# Patient Record
Sex: MALE | Race: WHITE | NOT HISPANIC OR LATINO | Employment: FULL TIME | URBAN - METROPOLITAN AREA
[De-identification: names, ages, dates, MRNs, and addresses within clinical notes are randomized per-mention and may not be internally consistent; named-entity substitution may affect disease eponyms.]

---

## 2019-02-15 ENCOUNTER — HOSPITAL ENCOUNTER (EMERGENCY)
Facility: HOSPITAL | Age: 29
Discharge: HOME | End: 2019-02-16
Attending: EMERGENCY MEDICINE
Payer: MEDICAID

## 2019-02-15 VITALS
WEIGHT: 165 LBS | HEIGHT: 69 IN | OXYGEN SATURATION: 97 % | TEMPERATURE: 99 F | SYSTOLIC BLOOD PRESSURE: 129 MMHG | BODY MASS INDEX: 24.44 KG/M2 | HEART RATE: 93 BPM | RESPIRATION RATE: 18 BRPM | DIASTOLIC BLOOD PRESSURE: 80 MMHG

## 2019-02-15 DIAGNOSIS — T14.90XA TRAUMA: ICD-10-CM

## 2019-02-15 DIAGNOSIS — M70.42 PREPATELLAR BURSITIS OF LEFT KNEE: Primary | ICD-10-CM

## 2019-02-15 PROCEDURE — 99284 EMERGENCY DEPT VISIT MOD MDM: CPT | Mod: 25

## 2019-02-15 PROCEDURE — 96372 THER/PROPH/DIAG INJ SC/IM: CPT

## 2019-02-16 LAB
ALBUMIN SERPL BCP-MCNC: 4.1 G/DL
ALP SERPL-CCNC: 82 U/L
ALT SERPL W/O P-5'-P-CCNC: 49 U/L
ANION GAP SERPL CALC-SCNC: 7 MMOL/L
AST SERPL-CCNC: 42 U/L
BASOPHILS # BLD AUTO: 0.02 K/UL
BASOPHILS NFR BLD: 0.3 %
BILIRUB SERPL-MCNC: 1 MG/DL
BUN SERPL-MCNC: 9 MG/DL
CALCIUM SERPL-MCNC: 10.1 MG/DL
CHLORIDE SERPL-SCNC: 101 MMOL/L
CO2 SERPL-SCNC: 28 MMOL/L
CREAT SERPL-MCNC: 0.8 MG/DL
CRP SERPL-MCNC: 4.8 MG/L
DIFFERENTIAL METHOD: NORMAL
EOSINOPHIL # BLD AUTO: 0.1 K/UL
EOSINOPHIL NFR BLD: 1.8 %
ERYTHROCYTE [DISTWIDTH] IN BLOOD BY AUTOMATED COUNT: 13 %
EST. GFR  (AFRICAN AMERICAN): >60 ML/MIN/1.73 M^2
EST. GFR  (NON AFRICAN AMERICAN): >60 ML/MIN/1.73 M^2
GLUCOSE SERPL-MCNC: 113 MG/DL
HCT VFR BLD AUTO: 42.8 %
HGB BLD-MCNC: 14.6 G/DL
LYMPHOCYTES # BLD AUTO: 2 K/UL
LYMPHOCYTES NFR BLD: 29.7 %
MCH RBC QN AUTO: 29.4 PG
MCHC RBC AUTO-ENTMCNC: 34.1 G/DL
MCV RBC AUTO: 86 FL
MONOCYTES # BLD AUTO: 0.8 K/UL
MONOCYTES NFR BLD: 12.2 %
NEUTROPHILS # BLD AUTO: 3.8 K/UL
NEUTROPHILS NFR BLD: 55.9 %
PLATELET # BLD AUTO: 223 K/UL
PMV BLD AUTO: 10.3 FL
POTASSIUM SERPL-SCNC: 3.2 MMOL/L
PROT SERPL-MCNC: 7.2 G/DL
RBC # BLD AUTO: 4.97 M/UL
SODIUM SERPL-SCNC: 136 MMOL/L
WBC # BLD AUTO: 6.8 K/UL

## 2019-02-16 PROCEDURE — 80053 COMPREHEN METABOLIC PANEL: CPT

## 2019-02-16 PROCEDURE — 85025 COMPLETE CBC W/AUTO DIFF WBC: CPT

## 2019-02-16 PROCEDURE — 86140 C-REACTIVE PROTEIN: CPT

## 2019-02-16 PROCEDURE — 25000003 PHARM REV CODE 250: Performed by: NURSE PRACTITIONER

## 2019-02-16 PROCEDURE — S0077 INJECTION, CLINDAMYCIN PHOSP: HCPCS | Performed by: NURSE PRACTITIONER

## 2019-02-16 RX ORDER — NAPROXEN 500 MG/1
500 TABLET ORAL 2 TIMES DAILY WITH MEALS
Qty: 20 TABLET | Refills: 0 | OUTPATIENT
Start: 2019-02-16 | End: 2019-05-26

## 2019-02-16 RX ORDER — CLINDAMYCIN HYDROCHLORIDE 150 MG/1
450 CAPSULE ORAL EVERY 8 HOURS
Qty: 63 CAPSULE | Refills: 0 | Status: SHIPPED | OUTPATIENT
Start: 2019-02-16 | End: 2019-02-23

## 2019-02-16 RX ORDER — CLINDAMYCIN PHOSPHATE 150 MG/ML
600 INJECTION, SOLUTION INTRAVENOUS
Status: COMPLETED | OUTPATIENT
Start: 2019-02-16 | End: 2019-02-16

## 2019-02-16 RX ADMIN — CLINDAMYCIN PHOSPHATE 600 MG: 150 INJECTION, SOLUTION INTRAMUSCULAR; INTRAVENOUS at 01:02

## 2019-02-16 RX ADMIN — BACITRACIN, NEOMYCIN, POLYMYXIN B 1 EACH: 400; 3.5; 5 OINTMENT TOPICAL at 01:02

## 2019-02-16 NOTE — ED TRIAGE NOTES
Patient presents to ER via personal transport after sustaining a fall off of a ladder a week ago. Patient states he fell on a metal pole. Left knee is swollen, red, and hot to touch. Patient states he got a tetanus shot last year. Vitals stable, no distress noted. Family member at bedside.

## 2019-02-16 NOTE — ED NOTES
Patient refused to wait 15 minutes for shot time. Héctor Ray NP notified. Patient made aware of risks such as allergic reaction, permanent disability, and death. AMA documentation signed.

## 2019-02-16 NOTE — ED PROVIDER NOTES
Encounter Date: 2/15/2019       History     Chief Complaint   Patient presents with    Knee Injury     To ER with c/o left knee injury x 1 week.  pt states that he fell off of a ladder and had a puncture wound.  Left knee started with increased swelling today.  pt states that he had a tetnus shot last year.     29 y/o male with PMH of heroin abuse presents for pain, swelling and redness to left knee after falling off a ladder and puncturing the skin one week ago.  Pain is worse with standing, walking and flexion. Pt last used heroin today. Denies F or drainage from site.       The history is provided by the patient.     Review of patient's allergies indicates:   Allergen Reactions    Iodine and iodide containing products Swelling     seafood     Past Medical History:   Diagnosis Date    Hernia, umbilical      Past Surgical History:   Procedure Laterality Date    HERNIA REPAIR       No family history on file.  Social History     Tobacco Use    Smoking status: Never Smoker   Substance Use Topics    Alcohol use: No     Comment: Denies    Drug use: Yes     Comment: Synthetic marijuana, suboxone     Review of Systems   Constitutional: Negative for chills and fever.   Respiratory: Negative for shortness of breath.    Gastrointestinal: Negative for abdominal pain, diarrhea, nausea and vomiting.   Genitourinary: Negative for difficulty urinating.   Musculoskeletal: Positive for gait problem (d/t pain to L knee) and joint swelling (L knee).   Skin: Positive for wound (L knee, L inner thigh).   Allergic/Immunologic: Negative for immunocompromised state.   Neurological: Negative for weakness.   Hematological: Does not bruise/bleed easily.   All other systems reviewed and are negative.      Physical Exam     Initial Vitals [02/15/19 2327]   BP Pulse Resp Temp SpO2   129/80 93 18 98.5 °F (36.9 °C) 97 %      MAP       --         Physical Exam    Nursing note and vitals reviewed.  Constitutional: Vital signs are normal. He  appears well-developed and well-nourished. He is cooperative.  Non-toxic appearance. He does not appear ill. No distress.   HENT:   Head: Atraumatic.   Eyes: Conjunctivae and EOM are normal.   Neck: Neck supple.   Cardiovascular: Normal rate and regular rhythm.   Pulmonary/Chest: Effort normal and breath sounds normal.   Abdominal: Normal appearance. There is no tenderness.   Musculoskeletal:        Left hip: Normal.        Left knee: He exhibits swelling and bony tenderness. He exhibits normal range of motion, no ecchymosis, no deformity, no laceration, no LCL laxity, normal patellar mobility and no MCL laxity. Tenderness found.        Left ankle: Normal.   Neurological: He is alert and oriented to person, place, and time. He has normal strength. No cranial nerve deficit or sensory deficit.   Skin: Skin is warm and dry. Capillary refill takes less than 2 seconds. Abrasion (multiple to L inner thigh and L knee) noted. No rash noted.   Psychiatric: He has a normal mood and affect. His speech is normal and behavior is normal.         ED Course   Procedures  Labs Reviewed   COMPREHENSIVE METABOLIC PANEL - Abnormal; Notable for the following components:       Result Value    Potassium 3.2 (*)     Glucose 113 (*)     AST 42 (*)     ALT 49 (*)     Anion Gap 7 (*)     All other components within normal limits   CBC W/ AUTO DIFFERENTIAL   C-REACTIVE PROTEIN          Imaging Results          X-Ray Knee 3 View Left (Final result)  Result time 02/16/19 00:17:43    Final result by Byron Flores MD (02/16/19 00:17:43)                 Impression:      No acute fracture.    Prepatellar soft tissue swelling, possible bursitis.      Electronically signed by: Byron Flores MD  Date:    02/16/2019  Time:    00:17             Narrative:    EXAMINATION:  XR KNEE 3 VIEW LEFT    CLINICAL HISTORY:  Injury, unspecified, initial encounter    TECHNIQUE:  AP, lateral, and Merchant views of the left knee were  performed.    COMPARISON:  None    FINDINGS:  No fracture or dislocation.  No joint effusion.  Cartilage spaces are maintained on nonweightbearing views.  Prepatellar soft tissue swelling.                                 Medical Decision Making:   Initial Assessment:   PMH of heroin abuse presents for pain, swelling and redness to left knee after falling off a ladder and puncturing the skin one week ago.  Used heroin today, denies F,drainage from site, NVI, painful ROM  TTP to patellar area, swelling and fluid present    Differential Diagnosis:   Cellulitis, septic joint, patellar fx, effusion  Clinical Tests:   Lab Tests: Ordered and Reviewed  Radiological Study: Ordered and Reviewed  ED Management:  Labs- no significant abnormalities  Xray-No acute fracture. Prepatellar soft tissue swelling, possible bursitis.  Pt refusing to be admitted to hospital for evaluation by Orthopedics tomorrow morning.    Clindamycin IM in the ED.  Polysporin to left inner thigh sores.   Pt to follow up with Orthopedics Monday, information given.  Return precautions discussed with patient, verbalized understanding.  Pt left AMA before shot time of Clindamycin finished  Other:   I have discussed this case with another health care provider.                      Clinical Impression:   The primary encounter diagnosis was Prepatellar bursitis of left knee. A diagnosis of Trauma was also pertinent to this visit.                             Julian Ray NP  02/16/19 2027

## 2019-05-25 ENCOUNTER — HOSPITAL ENCOUNTER (EMERGENCY)
Facility: HOSPITAL | Age: 29
Discharge: HOME | End: 2019-05-26
Attending: EMERGENCY MEDICINE
Payer: MEDICAID

## 2019-05-25 DIAGNOSIS — M54.41 ACUTE RIGHT-SIDED LOW BACK PAIN WITH RIGHT-SIDED SCIATICA: Primary | ICD-10-CM

## 2019-05-25 LAB
BILIRUB UR QL STRIP: NEGATIVE
CLARITY UR REFRACT.AUTO: CLEAR
COLOR UR AUTO: YELLOW
GLUCOSE UR QL STRIP: NEGATIVE
HGB UR QL STRIP: NEGATIVE
KETONES UR QL STRIP: NEGATIVE
LEUKOCYTE ESTERASE UR QL STRIP: NEGATIVE
NITRITE UR QL STRIP: NEGATIVE
PH UR STRIP: 6 [PH] (ref 5–8)
PROT UR QL STRIP: NEGATIVE
SP GR UR STRIP: 1.02 (ref 1–1.03)
URN SPEC COLLECT METH UR: NORMAL

## 2019-05-25 PROCEDURE — 86140 C-REACTIVE PROTEIN: CPT

## 2019-05-25 PROCEDURE — 99283 EMERGENCY DEPT VISIT LOW MDM: CPT

## 2019-05-25 PROCEDURE — 85652 RBC SED RATE AUTOMATED: CPT

## 2019-05-25 PROCEDURE — 99284 PR EMERGENCY DEPT VISIT,LEVEL IV: ICD-10-PCS | Mod: ,,, | Performed by: NURSE PRACTITIONER

## 2019-05-25 PROCEDURE — 85025 COMPLETE CBC W/AUTO DIFF WBC: CPT

## 2019-05-25 PROCEDURE — 81003 URINALYSIS AUTO W/O SCOPE: CPT

## 2019-05-25 PROCEDURE — 80053 COMPREHEN METABOLIC PANEL: CPT

## 2019-05-25 PROCEDURE — 25000003 PHARM REV CODE 250: Performed by: NURSE PRACTITIONER

## 2019-05-25 PROCEDURE — 99284 EMERGENCY DEPT VISIT MOD MDM: CPT | Mod: ,,, | Performed by: NURSE PRACTITIONER

## 2019-05-25 RX ORDER — NAPROXEN 500 MG/1
500 TABLET ORAL
Status: COMPLETED | OUTPATIENT
Start: 2019-05-25 | End: 2019-05-25

## 2019-05-25 RX ADMIN — NAPROXEN 500 MG: 500 TABLET ORAL at 11:05

## 2019-05-26 VITALS
BODY MASS INDEX: 21.94 KG/M2 | OXYGEN SATURATION: 99 % | SYSTOLIC BLOOD PRESSURE: 105 MMHG | DIASTOLIC BLOOD PRESSURE: 51 MMHG | HEART RATE: 75 BPM | RESPIRATION RATE: 18 BRPM | HEIGHT: 69 IN | WEIGHT: 148.13 LBS | TEMPERATURE: 99 F

## 2019-05-26 LAB
ALBUMIN SERPL BCP-MCNC: 3.6 G/DL (ref 3.5–5.2)
ALP SERPL-CCNC: 93 U/L (ref 55–135)
ALT SERPL W/O P-5'-P-CCNC: 35 U/L (ref 10–44)
ANION GAP SERPL CALC-SCNC: 5 MMOL/L (ref 8–16)
AST SERPL-CCNC: 27 U/L (ref 10–40)
BASOPHILS # BLD AUTO: 0.07 K/UL (ref 0–0.2)
BASOPHILS NFR BLD: 1.1 % (ref 0–1.9)
BILIRUB SERPL-MCNC: 0.4 MG/DL (ref 0.1–1)
BUN SERPL-MCNC: 11 MG/DL (ref 6–20)
CALCIUM SERPL-MCNC: 10.5 MG/DL (ref 8.7–10.5)
CHLORIDE SERPL-SCNC: 104 MMOL/L (ref 95–110)
CO2 SERPL-SCNC: 28 MMOL/L (ref 23–29)
CREAT SERPL-MCNC: 0.8 MG/DL (ref 0.5–1.4)
CRP SERPL-MCNC: 1.4 MG/L (ref 0–8.2)
DIFFERENTIAL METHOD: ABNORMAL
EOSINOPHIL # BLD AUTO: 0.4 K/UL (ref 0–0.5)
EOSINOPHIL NFR BLD: 5.7 % (ref 0–8)
ERYTHROCYTE [DISTWIDTH] IN BLOOD BY AUTOMATED COUNT: 12.6 % (ref 11.5–14.5)
ERYTHROCYTE [SEDIMENTATION RATE] IN BLOOD BY WESTERGREN METHOD: 6 MM/HR (ref 0–23)
EST. GFR  (AFRICAN AMERICAN): >60 ML/MIN/1.73 M^2
EST. GFR  (NON AFRICAN AMERICAN): >60 ML/MIN/1.73 M^2
GLUCOSE SERPL-MCNC: 120 MG/DL (ref 70–110)
HCT VFR BLD AUTO: 41.6 % (ref 40–54)
HGB BLD-MCNC: 13.8 G/DL (ref 14–18)
IMM GRANULOCYTES # BLD AUTO: 0.03 K/UL (ref 0–0.04)
IMM GRANULOCYTES NFR BLD AUTO: 0.5 % (ref 0–0.5)
LYMPHOCYTES # BLD AUTO: 2.3 K/UL (ref 1–4.8)
LYMPHOCYTES NFR BLD: 36.7 % (ref 18–48)
MCH RBC QN AUTO: 28.8 PG (ref 27–31)
MCHC RBC AUTO-ENTMCNC: 33.2 G/DL (ref 32–36)
MCV RBC AUTO: 87 FL (ref 82–98)
MONOCYTES # BLD AUTO: 0.6 K/UL (ref 0.3–1)
MONOCYTES NFR BLD: 9.8 % (ref 4–15)
NEUTROPHILS # BLD AUTO: 2.8 K/UL (ref 1.8–7.7)
NEUTROPHILS NFR BLD: 46.2 % (ref 38–73)
NRBC BLD-RTO: 0 /100 WBC
PLATELET # BLD AUTO: 212 K/UL (ref 150–350)
PMV BLD AUTO: 10 FL (ref 9.2–12.9)
POTASSIUM SERPL-SCNC: 3.9 MMOL/L (ref 3.5–5.1)
PROT SERPL-MCNC: 7 G/DL (ref 6–8.4)
RBC # BLD AUTO: 4.79 M/UL (ref 4.6–6.2)
SODIUM SERPL-SCNC: 137 MMOL/L (ref 136–145)
WBC # BLD AUTO: 6.13 K/UL (ref 3.9–12.7)

## 2019-05-26 PROCEDURE — 25000003 PHARM REV CODE 250: Performed by: NURSE PRACTITIONER

## 2019-05-26 RX ORDER — NAPROXEN 500 MG/1
500 TABLET ORAL 2 TIMES DAILY WITH MEALS
Qty: 10 TABLET | Refills: 0 | Status: SHIPPED | OUTPATIENT
Start: 2019-05-26 | End: 2019-05-31

## 2019-05-26 RX ORDER — LIDOCAINE 50 MG/G
1 PATCH TOPICAL
Status: DISCONTINUED | OUTPATIENT
Start: 2019-05-26 | End: 2019-05-26 | Stop reason: HOSPADM

## 2019-05-26 RX ORDER — LIDOCAINE 50 MG/G
1 PATCH TOPICAL DAILY
Qty: 5 PATCH | Refills: 0 | Status: SHIPPED | OUTPATIENT
Start: 2019-05-26 | End: 2019-05-31

## 2019-05-26 RX ADMIN — LIDOCAINE 1 PATCH: 50 PATCH TOPICAL at 12:05

## 2019-05-26 NOTE — ED PROVIDER NOTES
Encounter Date: 5/25/2019       History     Chief Complaint   Patient presents with    Sciatica     Pt arrives c/o pain that radiates from right lower back down right leg x 2 weeks.     Patient is a 28-year-old male with medical history of heroin abuse (last use today) presenting to the ED for low back pain radiating down his right leg.  Patient states pain started approximately 2 weeks ago.  Patient denies any falls or trauma.  Patient states pain has increased over the past 2 days.  Patient states he attempted ibuprofen with no relief of symptoms.  Patient denies any fever, chills, bowel or bladder incontinence, weakness or numbness and tingling to bilateral lower extremities.    The history is provided by the patient and the spouse.     Review of patient's allergies indicates:   Allergen Reactions    Iodine and iodide containing products Swelling     seafood     Past Medical History:   Diagnosis Date    Hernia, umbilical      Past Surgical History:   Procedure Laterality Date    HERNIA REPAIR       No family history on file.  Social History     Tobacco Use    Smoking status: Never Smoker   Substance Use Topics    Alcohol use: No     Comment: Denies    Drug use: Yes     Comment: Synthetic marijuana, suboxone     Review of Systems   Constitutional: Positive for activity change. Negative for appetite change, chills, fatigue and fever.   HENT: Negative for congestion and sore throat.    Eyes: Negative for photophobia and visual disturbance.   Respiratory: Negative for cough, chest tightness, shortness of breath and wheezing.    Cardiovascular: Negative for chest pain, palpitations and leg swelling.   Gastrointestinal: Negative for abdominal pain, constipation, diarrhea, nausea and vomiting.   Endocrine: Negative.    Genitourinary: Negative for decreased urine volume, difficulty urinating, discharge, dysuria, flank pain, frequency, hematuria, penile pain and urgency.   Musculoskeletal: Positive for back pain (  right lower ) and gait problem ( due to pain). Negative for joint swelling, myalgias, neck pain and neck stiffness.   Skin: Negative for color change, pallor, rash and wound.   Allergic/Immunologic: Positive for immunocompromised state.   Neurological: Negative for dizziness, syncope, weakness, numbness and headaches.   Hematological: Does not bruise/bleed easily.   Psychiatric/Behavioral: Negative.    All other systems reviewed and are negative.      Physical Exam     Initial Vitals [05/25/19 2223]   BP Pulse Resp Temp SpO2   117/68 77 14 97.5 °F (36.4 °C) 100 %      MAP       --         Physical Exam    Nursing note and vitals reviewed.  Constitutional: Vital signs are normal. He appears well-developed and well-nourished. He is cooperative. He does not have a sickly appearance. He does not appear ill. He appears distressed ( due to pain).   HENT:   Head: Normocephalic and atraumatic.   Mouth/Throat: Uvula is midline, oropharynx is clear and moist and mucous membranes are normal.   Eyes: Conjunctivae, EOM and lids are normal. Pupils are equal, round, and reactive to light.   Pupils ERR 2-1mm   Neck: Trachea normal, normal range of motion, full passive range of motion without pain and phonation normal. Neck supple. No spinous process tenderness and no muscular tenderness present.   Cardiovascular: Normal rate and regular rhythm.   Pulses:       Radial pulses are 2+ on the right side, and 2+ on the left side.        Dorsalis pedis pulses are 2+ on the right side, and 2+ on the left side.   Pulmonary/Chest: Effort normal and breath sounds normal.   Abdominal: Soft. Normal appearance and bowel sounds are normal. He exhibits no distension. There is no tenderness. There is no rigidity, no rebound and no guarding.   Musculoskeletal:        Lumbar back: He exhibits decreased range of motion ( due to pain), tenderness, pain and spasm. He exhibits no bony tenderness, no swelling, no edema, no deformity, no laceration and  normal pulse.        Back:    Right sided low back pain radiating to right leg.  t states shooting pain to foot.  (+) right sided straight leg test.  Pt denies any C,T,L spine tenderness. (+) 2 DP and popliteal pulse.  No TTP to right quad, calf or foot.  5/5 strength noted in RLE.  No redness, wounds or erythema noted.     Neurological: He is alert and oriented to person, place, and time. He has normal strength. No sensory deficit. He displays a negative Romberg sign. GCS eye subscore is 4. GCS verbal subscore is 5. GCS motor subscore is 6.   Reflex Scores:       Patellar reflexes are 2+ on the right side and 2+ on the left side.       Achilles reflexes are 2+ on the right side and 2+ on the left side.  Skin: Skin is warm, dry and intact. Capillary refill takes 2 to 3 seconds. No abrasion, no laceration, no lesion, no rash and no abscess noted. No cyanosis. Nails show no clubbing.         ED Course   Procedures  Labs Reviewed   CBC W/ AUTO DIFFERENTIAL - Abnormal; Notable for the following components:       Result Value    Hemoglobin 13.8 (*)     All other components within normal limits   COMPREHENSIVE METABOLIC PANEL - Abnormal; Notable for the following components:    Glucose 120 (*)     Anion Gap 5 (*)     All other components within normal limits   URINALYSIS, REFLEX TO URINE CULTURE    Narrative:     Preferred Collection Type->Urine, Clean Catch  yellow and gray top   SEDIMENTATION RATE   C-REACTIVE PROTEIN          Imaging Results    None          Medical Decision Making:   Initial Assessment:   Emergent evaluation of a 29 yo male patient presenting to the ER with chief complaint of low back pain for the past 10 days.  Pt states pain started after waking up one morning.  Pt states he has taken Ibuprofen for pain with no relief of symptoms.  Pt states he works as a  and has been unable to work due to the pain.  On exam pt A&Ox3.  VSS.  Pt nonfebrile and nontoxic appearing.  Pt in distress due to  pain.  Pupils ERR.  No C, T, L spine tenderness noted.  Right muscular TTP noted.  (+) right sided straight leg test noted.  Pt denies any C,T,L spine tenderness. (+) 2 DP and popliteal pulse.  No TTP to right quad, calf or foot.  5/5 strength noted in RLE.  No redness, wounds or erythema noted. Pt denies any bowel or bladder incontinence noted.  No numbness/tingling to groin noted.    Differential Diagnosis:   Differential diagnoses include but are not limited to muscle strain, discitis, disk herniation/pathology, radiculopathy, neuropathic pain, fx, sprain,  congenital abnormalities, cauda equina syndrome, abscess, UTI pyelonephritis.  Clinical Tests:   Lab Tests: Ordered and Reviewed  The following lab test(s) were unremarkable: CBC, CMP and Urinalysis       <> Summary of Lab: Pt CBC unremarkable.  No leukocytosis noted.  H&H stable at 13.8 and 41.6.  CMP unremarkable. ESR and CRP negative.  UA negative for any acute infectious process  ED Management:  I discussed the care of this patient with my Supervising Physician.      Pt reassessed.  Pt states slightly better after lidocaine patch.  Pt and wife updated on lab results.  Pt given strict return to ED precautions.  Pt and wife given signs and symptoms of spinal abscess precautions due to heroin use.  Pt and wife verbalized understanding of this plan of care.  All questions and concerns addressed.      Patient is hemodynamically stable, vital signs are normal. Discharge instructions given. Prescription for Lidoderm given and explained. Return to ED precautions discussed. Follow up as directed. Pt verbalized understanding of this plan. Pt is stable for discharge.                         Clinical Impression:       ICD-10-CM ICD-9-CM   1. Acute right-sided low back pain with right-sided sciatica M54.41 724.2     724.3         Disposition:   Disposition: Discharged  Condition: Stable                        Nafisa Rene NP  05/26/19 1214

## 2019-05-26 NOTE — ED TRIAGE NOTES
"Pt reports pain going down his entire left side of body. feelsl bryan it starts in lower back and radiates down the rest of body to the toes.  . 10/10. Pt is an  has been working but didn't today because it " just getting worse". Pt repots not  Being able to get out of bed in the morning due to pain. Pt states hard to urinate and have bowel movements. Pt has steady gate  No assist. Aaox4.       Family present at bedside..       Psychosocial:  Patient is calm and cooperative.  Patients insight and judgement are appropriate to situation.  Appears clean, well maintained, with clothing appropriate to environment.  No evidence of delusions, hallucinations, or psychosis.     Neuro:  Eyes open spontaneously.  Awake, alert, oriented x 4.  Speech clear and appropriate.  Tolerating saliva secretions well.  Able to follow commands, demonstrating ability to actively and appropriately communicate within context of current conversation.  Symmetrical facial muscles.  Moving all extremities well with no noted weakness  .     Airway:  Bilateral chest rise and fall.  RR regular and non-labored.  Air entry patent with and clear x 5 lobes of the lungs.  No crepitus or subcutaneous emphysema noted on palpation.       Circulatory:  Skin warm, dry, and pink.  Apical and radial pulses strong and regular.  Capillary refill/skin blanching less than 3 seconds to distal of 4 extremities.     Abdomen:  Abdomen soft and non-distended.  Positive normo-active bowel sounds x 4 quadrants.       Urinary: Denies pressure, frequency, urgency, odor or pain.     Extremities:  No redness, heat, deformity, or pain.     Skin:  Intact with no bruising/discolorations noted.    "

## 2019-05-26 NOTE — DISCHARGE INSTRUCTIONS
Your labs today were unremarkable.  Please Take medications as prescribed.  Follow up with your PCP as needed.      Our goal in the emergency department is to always give you outstanding care and exceptional service. You may receive a survey by mail or e-mail in the next week regarding your experience in our ED. We would greatly appreciate your completing and returning the survey. Your feedback provides us with a way to recognize our staff who give very good care and it helps us learn how to improve when your experience was below our aspiration of excellence.

## 2019-06-13 ENCOUNTER — HOSPITAL ENCOUNTER (EMERGENCY)
Facility: HOSPITAL | Age: 29
Discharge: HOME | End: 2019-06-13
Attending: EMERGENCY MEDICINE
Payer: MEDICAID

## 2019-06-13 VITALS
OXYGEN SATURATION: 99 % | HEART RATE: 84 BPM | BODY MASS INDEX: 23.7 KG/M2 | HEIGHT: 69 IN | TEMPERATURE: 98 F | SYSTOLIC BLOOD PRESSURE: 134 MMHG | DIASTOLIC BLOOD PRESSURE: 72 MMHG | WEIGHT: 160 LBS | RESPIRATION RATE: 18 BRPM

## 2019-06-13 DIAGNOSIS — M46.1 SACROILIITIS: Primary | ICD-10-CM

## 2019-06-13 PROCEDURE — 99284 EMERGENCY DEPT VISIT MOD MDM: CPT | Mod: 25

## 2019-06-13 PROCEDURE — 99284 EMERGENCY DEPT VISIT MOD MDM: CPT | Mod: ,,, | Performed by: PHYSICIAN ASSISTANT

## 2019-06-13 PROCEDURE — 99284 PR EMERGENCY DEPT VISIT,LEVEL IV: ICD-10-PCS | Mod: ,,, | Performed by: PHYSICIAN ASSISTANT

## 2019-06-13 PROCEDURE — 63600175 PHARM REV CODE 636 W HCPCS: Performed by: PHYSICIAN ASSISTANT

## 2019-06-13 PROCEDURE — 25000003 PHARM REV CODE 250: Performed by: PHYSICIAN ASSISTANT

## 2019-06-13 PROCEDURE — 96372 THER/PROPH/DIAG INJ SC/IM: CPT

## 2019-06-13 RX ORDER — KETOROLAC TROMETHAMINE 30 MG/ML
10 INJECTION, SOLUTION INTRAMUSCULAR; INTRAVENOUS
Status: COMPLETED | OUTPATIENT
Start: 2019-06-13 | End: 2019-06-13

## 2019-06-13 RX ORDER — METHOCARBAMOL 750 MG/1
1500 TABLET, FILM COATED ORAL 3 TIMES DAILY
Qty: 30 TABLET | Refills: 0 | Status: SHIPPED | OUTPATIENT
Start: 2019-06-13 | End: 2019-06-18

## 2019-06-13 RX ORDER — METHOCARBAMOL 750 MG/1
1500 TABLET, FILM COATED ORAL
Status: COMPLETED | OUTPATIENT
Start: 2019-06-13 | End: 2019-06-13

## 2019-06-13 RX ORDER — NAPROXEN 500 MG/1
500 TABLET ORAL 2 TIMES DAILY WITH MEALS
Qty: 20 TABLET | Refills: 0 | Status: SHIPPED | OUTPATIENT
Start: 2019-06-13 | End: 2022-04-19

## 2019-06-13 RX ADMIN — KETOROLAC TROMETHAMINE 10 MG: 30 INJECTION, SOLUTION INTRAMUSCULAR at 02:06

## 2019-06-13 RX ADMIN — METHOCARBAMOL TABLETS 1500 MG: 750 TABLET, COATED ORAL at 02:06

## 2019-06-13 NOTE — DISCHARGE INSTRUCTIONS
Use naproxen and Robaxin as directed as needed  Follow up with primary care and return to the ER as needed    Our goal in the emergency department is to always give you outstanding care and exceptional service. You may receive a survey by mail or e-mail in the next week regarding your experience in our ED. We would greatly appreciate your completing and returning the survey. Your feedback provides us with a way to recognize our staff who give very good care and it helps us learn how to improve when your experience was below our aspiration of excellence.

## 2019-06-13 NOTE — ED PROVIDER NOTES
Encounter Date: 6/13/2019       History     Chief Complaint   Patient presents with    Back Pain     Pt to ER via POV c/o right lower back pain radiating down right leg. He states he was seen here recently for same complaint and pain is not getting better with prescription meds he was given.      28-year-old male to the ER for evaluation of right back pain.  Back pain begins in the buttocks in travels down the leg.  Pain started a few weeks ago.  Denies any trauma or injury.  Was seen in the ED and given a lidocaine patch.  He reports minimal improvement in his symptoms.  He denies any abdominal pain or chest pain.  He denies any nausea or vomiting.  Pain is in the buttocks and actually not in the back.  He denies any fevers chills or chest pain.        Review of patient's allergies indicates:   Allergen Reactions    Iodine and iodide containing products Swelling     seafood     Past Medical History:   Diagnosis Date    Hernia, umbilical      Past Surgical History:   Procedure Laterality Date    HERNIA REPAIR       History reviewed. No pertinent family history.  Social History     Tobacco Use    Smoking status: Never Smoker   Substance Use Topics    Alcohol use: No     Comment: Denies    Drug use: Yes     Comment: Synthetic marijuana, suboxone     Review of Systems   Constitutional: Negative for fever.   HENT: Negative for sore throat.    Respiratory: Negative for shortness of breath.    Cardiovascular: Negative for chest pain.   Gastrointestinal: Negative for nausea.   Genitourinary: Negative for dysuria.   Musculoskeletal: Negative for back pain.        Pain to R buttocks, into the R leg   Skin: Negative for rash.   Neurological: Negative for weakness.   Hematological: Does not bruise/bleed easily.       Physical Exam     Initial Vitals [06/13/19 0049]   BP Pulse Resp Temp SpO2   127/82 80 14 97.7 °F (36.5 °C) 100 %      MAP       --         Physical Exam    Constitutional: Vital signs are normal. He  appears well-developed and well-nourished.   HENT:   Head: Normocephalic and atraumatic.   Eyes: Conjunctivae are normal.   Cardiovascular: Normal rate and regular rhythm.   Abdominal: Soft. Normal appearance and bowel sounds are normal.   Musculoskeletal: Normal range of motion.   Lumbar spine nontender  Very tender to the right SI joint  Positive SLR  Normal exam of the lower extremity strength sensation and reflexes intact   Neurological: He is alert and oriented to person, place, and time.   Skin: Skin is warm and intact.   Psychiatric: He has a normal mood and affect. His speech is normal and behavior is normal. Cognition and memory are normal.         ED Course   Procedures  Labs Reviewed - No data to display       Imaging Results    None          Medical Decision Making:   History:   Old Medical Records: I decided to obtain old medical records.  Initial Assessment:   28-year-old male with radicular pain to the right lower extremity  Differential Diagnosis:   Sciatica, sacroiliitis, lumbago   ED Management:  No acute red flags on exam, I do not believe imaging is indicated at this time.   I suspect symptoms secondary to sacroiliitis.  Will start the patient on naproxen and Robaxin and discharge                      Clinical Impression:       ICD-10-CM ICD-9-CM   1. Sacroiliitis M46.1 720.2         Disposition:   Disposition: Discharged  Condition: Stable                        Juan Luis Johns PA-C  06/13/19 0329

## 2019-06-13 NOTE — ED TRIAGE NOTES
Gary Selby, a 28 y.o. male presents to the ED w/ complaint of lower back pain x several days which radiates to his right leg. Denies any recent injury. Pt states that he has been recently seen for the same complaint, however medications have not been helping.     Triage note:  Chief Complaint   Patient presents with    Back Pain     Pt to ER via POV c/o right lower back pain radiating down right leg. He states he was seen here recently for same complaint and pain is not getting better with prescription meds he was given.      Review of patient's allergies indicates:   Allergen Reactions    Iodine and iodide containing products Swelling     seafood     Past Medical History:   Diagnosis Date    Hernia, umbilical        Adult Physical Assessment  LOC: Gary Selby, 28 y.o. male verified via two identifiers.  The patient is awake, alert, oriented and speaking appropriately at this time.  APPEARANCE: Patient resting comfortably and appears to be in no acute distress at this time. Patient is clean and well groomed, patient's clothing is properly fastened.  SKIN:The skin is warm and dry, color consistent with ethnicity, patient has normal skin turgor and moist mucus membranes, skin intact, no breakdown or brusing noted.  MUSCULOSKELETAL: Patient moving all extremities well, no obvious swelling or deformities noted.  RESPIRATORY: Airway is open and patent, respirations are spontaneous, patient has a normal effort and rate, no accessory muscle use noted.  CARDIAC: Patient has a normal rate and rhythm, no periphreal edema noted in any extremity, capillary refill < 3 seconds in all extremities  ABDOMEN: Soft and non tender to palpation, no abdominal distention noted. Bowel sounds present in all four quadrants.  NEUROLOGIC: Eyes open spontaneously, behavior appropriate to situation, follows commands, facial expression symmetrical, bilateral hand grasp equal and even, purposeful motor response noted, normal  sensation in all extremities when touched with a finger.

## 2019-06-13 NOTE — ED NOTES
Bed: [facility] 02  Expected date:   Expected time:   Means of arrival:   Comments:

## 2020-08-17 ENCOUNTER — OFFICE VISIT (OUTPATIENT)
Dept: URGENT CARE | Facility: CLINIC | Age: 30
End: 2020-08-17
Payer: MEDICAID

## 2020-08-17 VITALS
DIASTOLIC BLOOD PRESSURE: 68 MMHG | HEIGHT: 69 IN | TEMPERATURE: 98 F | OXYGEN SATURATION: 98 % | RESPIRATION RATE: 16 BRPM | HEART RATE: 98 BPM | SYSTOLIC BLOOD PRESSURE: 118 MMHG | BODY MASS INDEX: 23.7 KG/M2 | WEIGHT: 160 LBS

## 2020-08-17 DIAGNOSIS — S00.81XA ABRASION OF FOREHEAD, INITIAL ENCOUNTER: ICD-10-CM

## 2020-08-17 DIAGNOSIS — S09.90XA CLOSED HEAD INJURY, INITIAL ENCOUNTER: Primary | ICD-10-CM

## 2020-08-17 PROCEDURE — 99203 PR OFFICE/OUTPT VISIT, NEW, LEVL III, 30-44 MIN: ICD-10-PCS | Mod: S$GLB,,, | Performed by: FAMILY MEDICINE

## 2020-08-17 PROCEDURE — 99203 OFFICE O/P NEW LOW 30 MIN: CPT | Mod: S$GLB,,, | Performed by: FAMILY MEDICINE

## 2020-08-18 NOTE — PROGRESS NOTES
"Subjective:       Patient ID: Gary Selby is a 30 y.o. male.    Vitals:  height is 5' 9" (1.753 m) and weight is 72.6 kg (160 lb). His temporal temperature is 98.1 °F (36.7 °C). His blood pressure is 118/68 and his pulse is 98. His respiration is 16 and oxygen saturation is 98%.     Chief Complaint: Laceration (L EYE)    Laceration   The incident occurred 1 to 3 hours ago. The laceration is located on the left eye. The laceration is 1 cm in size. Injury mechanism: SIDEWALK. The pain is at a severity of 8/10. The pain is mild. The pain has been constant since onset. He reports no foreign bodies present. His tetanus status is UTD.       Constitution: Negative.   HENT: Negative.    Neck: negative.   Cardiovascular: Negative.    Eyes: Negative.    Respiratory: Negative.    Gastrointestinal: Negative.    Endocrine: negative.   Genitourinary: Negative.    Musculoskeletal: Negative.    Skin: Positive for laceration.        L EYEBROW LAC   Allergic/Immunologic: Negative.    Neurological: Negative.    Hematologic/Lymphatic: Negative.    Psychiatric/Behavioral: Negative.        Objective:      Physical Exam   Constitutional: He is oriented to person, place, and time. He appears well-developed. He is cooperative.  Non-toxic appearance. He does not appear ill. No distress.   HENT:   Head: Normocephalic. Head is with laceration (superficial abrasion and laceration 1.5 cm ). Head is without raccoon's eyes, without Burns's sign and without left periorbital erythema.       Ears:   Right Ear: Hearing, tympanic membrane, external ear and ear canal normal.   Left Ear: Hearing, tympanic membrane, external ear and ear canal normal.   Nose: Nose normal. No mucosal edema, rhinorrhea or nasal deformity. No epistaxis. Right sinus exhibits no maxillary sinus tenderness and no frontal sinus tenderness. Left sinus exhibits no maxillary sinus tenderness and no frontal sinus tenderness.   Mouth/Throat: Uvula is midline, oropharynx is " clear and moist and mucous membranes are normal. No trismus in the jaw. Normal dentition. No uvula swelling. No posterior oropharyngeal erythema.   Eyes: Conjunctivae and lids are normal. Right eye exhibits no discharge. Left eye exhibits no discharge. No scleral icterus.   Neck: Trachea normal, normal range of motion, full passive range of motion without pain and phonation normal. Neck supple.   Cardiovascular: Normal rate, regular rhythm, normal heart sounds and normal pulses.   Pulmonary/Chest: Effort normal and breath sounds normal. No respiratory distress.   Abdominal: Soft. Normal appearance and bowel sounds are normal. He exhibits no distension, no pulsatile midline mass and no mass. There is no abdominal tenderness.   Musculoskeletal: Normal range of motion.         General: No deformity.   Neurological: He is alert and oriented to person, place, and time. He exhibits normal muscle tone. Coordination normal.   Skin: Skin is warm, dry, intact, not diaphoretic and not pale. Psychiatric: His speech is normal and behavior is normal. Judgment and thought content normal.   Nursing note and vitals reviewed.        Assessment:       1. Closed head injury, initial encounter    2. Abrasion of forehead, initial encounter        Plan:         Closed head injury, initial encounter    Abrasion of forehead, initial encounter

## 2020-08-18 NOTE — PATIENT INSTRUCTIONS
Head Injury (Adult)    You have a head injury. It does not appear serious at this time. But symptoms of a more serious problem, such as a mild brain injury (concussion) or bruising or bleeding in the brain, may appear later. For this reason, you or someone caring for you will need to watch for the symptoms listed below. Once youre home, also be sure to follow any care instructions youre given.  Home care  Watch for the following symptoms  Seek emergency medical care if you have any of these symptoms over the next hours to days:   · Headache  · Nausea or vomiting  · Dizziness  · Sensitivity to light or noise  · Unusual sleepiness or grogginess  · Trouble falling asleep  · Personality changes  · Vision changes  · Memory loss  · Confusion  · Trouble walking or clumsiness  · Loss of consciousness (even for a short time)  · Inability to be awakened  · Stiff neck  · Weakness or numbness in any part of the body  · Seizures  General care  · If you were prescribed medicines for pain, use them as directed. Note: Dont take other medicines for pain without talking to your provider first.  · To help reduce swelling and pain, apply a cold source to the injured area for up to 20 minutes at a time. Do this as often as directed. Use a cold pack or bag of ice wrapped in a thin towel. Never apply a cold source directly to the skin.  · If you have cuts or scrapes as a result of your head injury, care for them as directed.  · For the next 24 hours (or longer, if instructed):  ¨ Dont drink alcohol or use sedatives or other medicines that make you sleepy.  ¨ Dont drive or operate machinery.  ¨ Dont do anything strenuous, such as heavy lifting or straining.  ¨ Limit tasks that require concentration. This includes reading, using a smartphone or computer, watching TV, and playing video games.  ¨ Dont return to sports or other activities that could result in another head injury.  Follow-up care  Follow up with your healthcare  provider, or as directed. If imaging tests were done, they will be reviewed by a doctor. You will be told the results and any new findings that may affect your care.  When to seek medical advice  Call your healthcare provider right away if any of these occur:  · Pain doesnt get better or worsens  · New or increased swelling or bruising  · Fever of 100.4°F (38°C) or higher, or as directed by your provider  · Increased redness, warmth, drainage, or bleeding from the injured area  · Fluid drainage or bleeding from the nose or ears  · Any depression or bony abnormality in the injured area   · PT REFUSED TO GO TO ED FOR CT SCAN PT IS ALERT AND ORIENTED TIMES 3  Date Last Reviewed: 9/26/2015  © 2472-3604 [employer]. [address] [place] [address], [place] [address]. All rights reserved. This information is not intended as a substitute for professional medical care. Always follow your healthcare professional's instructions.

## 2022-04-19 ENCOUNTER — HOSPITAL ENCOUNTER (OUTPATIENT)
Facility: HOSPITAL | Age: 32
Discharge: LEFT BEFORE DISCHARGE | End: 2022-04-20
Attending: EMERGENCY MEDICINE | Admitting: FAMILY MEDICINE
Payer: MEDICAID

## 2022-04-19 DIAGNOSIS — K08.409 STATUS POST TOOTH EXTRACTION: ICD-10-CM

## 2022-04-19 DIAGNOSIS — L08.9 SOFT TISSUE INFECTION: ICD-10-CM

## 2022-04-19 DIAGNOSIS — K12.2 LUDWIG'S ANGINA: Primary | ICD-10-CM

## 2022-04-19 LAB
ALBUMIN SERPL BCP-MCNC: 4 G/DL (ref 3.5–5.2)
ALP SERPL-CCNC: 70 U/L (ref 55–135)
ALT SERPL W/O P-5'-P-CCNC: 32 U/L (ref 10–44)
ANION GAP SERPL CALC-SCNC: 7 MMOL/L (ref 8–16)
AST SERPL-CCNC: 24 U/L (ref 10–40)
BASOPHILS # BLD AUTO: 0.06 K/UL (ref 0–0.2)
BASOPHILS NFR BLD: 0.7 % (ref 0–1.9)
BILIRUB SERPL-MCNC: 0.4 MG/DL (ref 0.1–1)
BUN SERPL-MCNC: 9 MG/DL (ref 6–20)
CALCIUM SERPL-MCNC: 10.8 MG/DL (ref 8.7–10.5)
CHLORIDE SERPL-SCNC: 110 MMOL/L (ref 95–110)
CO2 SERPL-SCNC: 25 MMOL/L (ref 23–29)
CREAT SERPL-MCNC: 0.8 MG/DL (ref 0.5–1.4)
CTP QC/QA: YES
DIFFERENTIAL METHOD: NORMAL
EOSINOPHIL # BLD AUTO: 0.2 K/UL (ref 0–0.5)
EOSINOPHIL NFR BLD: 2.5 % (ref 0–8)
ERYTHROCYTE [DISTWIDTH] IN BLOOD BY AUTOMATED COUNT: 13.4 % (ref 11.5–14.5)
EST. GFR  (AFRICAN AMERICAN): >60 ML/MIN/1.73 M^2
EST. GFR  (NON AFRICAN AMERICAN): >60 ML/MIN/1.73 M^2
GLUCOSE SERPL-MCNC: 84 MG/DL (ref 70–110)
HCT VFR BLD AUTO: 43 % (ref 40–54)
HGB BLD-MCNC: 14.4 G/DL (ref 14–18)
IMM GRANULOCYTES # BLD AUTO: 0.02 K/UL (ref 0–0.04)
IMM GRANULOCYTES NFR BLD AUTO: 0.2 % (ref 0–0.5)
LYMPHOCYTES # BLD AUTO: 2 K/UL (ref 1–4.8)
LYMPHOCYTES NFR BLD: 21.5 % (ref 18–48)
MCH RBC QN AUTO: 30 PG (ref 27–31)
MCHC RBC AUTO-ENTMCNC: 33.5 G/DL (ref 32–36)
MCV RBC AUTO: 90 FL (ref 82–98)
MONOCYTES # BLD AUTO: 0.8 K/UL (ref 0.3–1)
MONOCYTES NFR BLD: 8.5 % (ref 4–15)
NEUTROPHILS # BLD AUTO: 6.1 K/UL (ref 1.8–7.7)
NEUTROPHILS NFR BLD: 66.6 % (ref 38–73)
NRBC BLD-RTO: 0 /100 WBC
PLATELET # BLD AUTO: 200 K/UL (ref 150–450)
PMV BLD AUTO: 10.7 FL (ref 9.2–12.9)
POTASSIUM SERPL-SCNC: 4.2 MMOL/L (ref 3.5–5.1)
PROT SERPL-MCNC: 6.8 G/DL (ref 6–8.4)
RBC # BLD AUTO: 4.8 M/UL (ref 4.6–6.2)
SARS-COV-2 RDRP RESP QL NAA+PROBE: NEGATIVE
SODIUM SERPL-SCNC: 142 MMOL/L (ref 136–145)
WBC # BLD AUTO: 9.15 K/UL (ref 3.9–12.7)

## 2022-04-19 PROCEDURE — 80053 COMPREHEN METABOLIC PANEL: CPT | Performed by: EMERGENCY MEDICINE

## 2022-04-19 PROCEDURE — G0378 HOSPITAL OBSERVATION PER HR: HCPCS

## 2022-04-19 PROCEDURE — 25500020 PHARM REV CODE 255: Performed by: EMERGENCY MEDICINE

## 2022-04-19 PROCEDURE — 99285 EMERGENCY DEPT VISIT HI MDM: CPT | Mod: 25

## 2022-04-19 PROCEDURE — 25000003 PHARM REV CODE 250: Performed by: EMERGENCY MEDICINE

## 2022-04-19 PROCEDURE — 96375 TX/PRO/DX INJ NEW DRUG ADDON: CPT

## 2022-04-19 PROCEDURE — 96361 HYDRATE IV INFUSION ADD-ON: CPT

## 2022-04-19 PROCEDURE — U0002 COVID-19 LAB TEST NON-CDC: HCPCS | Performed by: EMERGENCY MEDICINE

## 2022-04-19 PROCEDURE — 85025 COMPLETE CBC W/AUTO DIFF WBC: CPT | Performed by: EMERGENCY MEDICINE

## 2022-04-19 PROCEDURE — 63600175 PHARM REV CODE 636 W HCPCS: Performed by: EMERGENCY MEDICINE

## 2022-04-19 PROCEDURE — 96365 THER/PROPH/DIAG IV INF INIT: CPT

## 2022-04-19 PROCEDURE — 96376 TX/PRO/DX INJ SAME DRUG ADON: CPT

## 2022-04-19 PROCEDURE — 25000003 PHARM REV CODE 250: Performed by: NURSE PRACTITIONER

## 2022-04-19 RX ORDER — MORPHINE SULFATE 4 MG/ML
4 INJECTION, SOLUTION INTRAMUSCULAR; INTRAVENOUS
Status: COMPLETED | OUTPATIENT
Start: 2022-04-19 | End: 2022-04-19

## 2022-04-19 RX ORDER — SODIUM CHLORIDE 9 MG/ML
INJECTION, SOLUTION INTRAVENOUS CONTINUOUS
Status: DISCONTINUED | OUTPATIENT
Start: 2022-04-19 | End: 2022-04-20 | Stop reason: HOSPADM

## 2022-04-19 RX ORDER — DIPHENHYDRAMINE HYDROCHLORIDE 50 MG/ML
25 INJECTION INTRAMUSCULAR; INTRAVENOUS
Status: DISCONTINUED | OUTPATIENT
Start: 2022-04-19 | End: 2022-04-19

## 2022-04-19 RX ORDER — MORPHINE SULFATE 4 MG/ML
4 INJECTION, SOLUTION INTRAMUSCULAR; INTRAVENOUS EVERY 6 HOURS PRN
Status: DISCONTINUED | OUTPATIENT
Start: 2022-04-19 | End: 2022-04-20 | Stop reason: HOSPADM

## 2022-04-19 RX ORDER — METHYLPREDNISOLONE SOD SUCC 125 MG
125 VIAL (EA) INJECTION
Status: COMPLETED | OUTPATIENT
Start: 2022-04-19 | End: 2022-04-19

## 2022-04-19 RX ORDER — DIPHENHYDRAMINE HYDROCHLORIDE 50 MG/ML
50 INJECTION INTRAMUSCULAR; INTRAVENOUS
Status: COMPLETED | OUTPATIENT
Start: 2022-04-19 | End: 2022-04-19

## 2022-04-19 RX ADMIN — METHYLPREDNISOLONE SODIUM SUCCINATE 125 MG: 125 INJECTION, POWDER, FOR SOLUTION INTRAMUSCULAR; INTRAVENOUS at 07:04

## 2022-04-19 RX ADMIN — SODIUM CHLORIDE 1000 ML: 0.9 INJECTION, SOLUTION INTRAVENOUS at 05:04

## 2022-04-19 RX ADMIN — IOHEXOL 75 ML: 350 INJECTION, SOLUTION INTRAVENOUS at 08:04

## 2022-04-19 RX ADMIN — SODIUM CHLORIDE: 0.9 INJECTION, SOLUTION INTRAVENOUS at 11:04

## 2022-04-19 RX ADMIN — MORPHINE SULFATE 4 MG: 4 INJECTION INTRAVENOUS at 06:04

## 2022-04-19 RX ADMIN — MORPHINE SULFATE 4 MG: 4 INJECTION INTRAVENOUS at 07:04

## 2022-04-19 RX ADMIN — DIPHENHYDRAMINE HYDROCHLORIDE 50 MG: 50 INJECTION INTRAMUSCULAR; INTRAVENOUS at 07:04

## 2022-04-19 RX ADMIN — AMPICILLIN SODIUM AND SULBACTAM SODIUM 3 G: 2; 1 INJECTION, POWDER, FOR SOLUTION INTRAMUSCULAR; INTRAVENOUS at 06:04

## 2022-04-19 NOTE — ED PROVIDER NOTES
Encounter Date: 4/19/2022    SCRIBE #1 NOTE: I, Dayaminerva Gonzales , am scribing for, and in the presence of, Patrick Feng MD.       History     Chief Complaint   Patient presents with    Dental Pain     Pt had right lower tooth pulled today, pt complains of continued pain      This is a 31 y.o. male who has a past medical history of Hernia, umbilical.     The patient presents to the Emergency Department with dental pain.   He states he had two right lower teeth removed this morning.   He states difficulty with swallowing secondary to pain.   Swelling is noted to right side of his jaw, associated with pain.  No aggravating/alleviating factors.      The history is provided by the patient.     Review of patient's allergies indicates:   Allergen Reactions    Iodine and iodide containing products Swelling     seafood     Past Medical History:   Diagnosis Date    Hernia, umbilical      Past Surgical History:   Procedure Laterality Date    HERNIA REPAIR       No family history on file.  Social History     Tobacco Use    Smoking status: Never Smoker   Substance Use Topics    Alcohol use: No     Comment: Denies    Drug use: Yes     Comment: Synthetic marijuana, suboxone     Review of Systems   Constitutional: Negative for fever.   HENT: Positive for dental problem. Negative for sore throat.    Respiratory: Negative for shortness of breath.    Cardiovascular: Negative for chest pain.   Gastrointestinal: Negative for nausea.   Genitourinary: Negative for dysuria.   Musculoskeletal: Negative for back pain.   Skin: Negative for rash.   Neurological: Negative for weakness.   Hematological: Does not bruise/bleed easily.       Physical Exam     Initial Vitals [04/19/22 1653]   BP Pulse Resp Temp SpO2   131/86 98 18 97.7 °F (36.5 °C) 98 %      MAP       --         Physical Exam    Nursing note and vitals reviewed.  Constitutional: He appears well-developed and well-nourished. He is not diaphoretic. He appears distressed.   HENT:    Head: Normocephalic and atraumatic.   Mouth/Throat: Oropharynx is clear and moist.   Significant submandibular swelling on the right side without induration, tender.  Tongue elevation   Can tolerate secretions  Teeth 31 and 32 are removed with blood noted in socket, tenderness to palpation  Normal range of motion to the mandible           Eyes: Conjunctivae are normal.   Neck:   As per HEENT   Normal range of motion.  Cardiovascular: Normal rate and regular rhythm.   Pulmonary/Chest: Breath sounds normal. No stridor. No respiratory distress.   Musculoskeletal:         General: Normal range of motion.      Cervical back: Normal range of motion.     Neurological: He is alert and oriented to person, place, and time. He has normal strength.   Skin: Skin is warm and dry. Capillary refill takes less than 2 seconds. No rash noted. No pallor.   Psychiatric: He has a normal mood and affect.         ED Course   Procedures  Labs Reviewed   COMPREHENSIVE METABOLIC PANEL - Abnormal; Notable for the following components:       Result Value    Calcium 10.8 (*)     Anion Gap 7 (*)     All other components within normal limits   CBC W/ AUTO DIFFERENTIAL          Imaging Results           CT Soft Tissue Neck With Contrast (Final result)  Result time 04/19/22 20:52:45    Final result by Vinny Patel DO (04/19/22 20:52:45)                 Impression:      Extensive inflammation and soft tissue edema of the right submandibular space extending to the right parapharyngeal space with resultant mass effect and leftward deviation of the oropharynx.  No evidence of a large rim enhancing fluid collection.  Correlate clinically for evidence of Jose angina.    This report was flagged in Epic as abnormal.    Dr. Patel discussed critical findings with Dr. Feng by telephone at 20:45 on 04/19/2022.      Electronically signed by: Vinny Patel  Date:    04/19/2022  Time:    20:52             Narrative:    EXAMINATION:  CT SOFT TISSUE NECK  WITH CONTRAST    CLINICAL HISTORY:  Neck abscess, deep tissue;    TECHNIQUE:  Low dose axial images, coronal and sagittal reformations were obtained from the skull base to the lung apices following the intravenous administration of 75 mL of Omnipaque 350.    COMPARISON:  None.    FINDINGS:  Suprahyoid neck: There is significant edema of the floor of the mouth involving the right submandibular, submental, and sublingual space.  There is thickening of the right platysma muscle.  There are several foci of soft tissue gas within the right buccal soft tissues.  There is no evidence of a rim enhancing fluid collection.  There is enlargement of the right submandibular gland.  There is soft tissue edema of the right  space and right parapharyngeal space, with mass effect and leftward deviation of the oropharynx. Retropharyngeal space is normal. The palatine tonsils are normal. The epiglottis is normal.    There is a surgical resection cavity of 2 right mandibular molars with several small foci of soft tissue gas within the resection cavity.    Orbits, paranasal sinuses, and skull base: Normal.    Nasopharynx: Normal.    Infrahyoid neck: Normal larynx, hypopharynx, and supraglottis.    Thyroid: Normal.    Submandibular and parotid glands: Normal.    Lymph nodes: There is a right level 2 lymph node measuring 8 mm (series 601, image 89).  There is a left paratracheal node (series 2, image 143), measuring 9 mm, likely reactive.    Vascular structures: Normal.    Airway: The trachea is patent and midline.    Lung apices: Clear.    Musculoskeletal: No aggressive osseous lesion. No acute fracture.                                 Medications   ampicillin-sulbactam 3 g in sodium chloride 0.9 % 100 mL IVPB (ready to mix system) (has no administration in time range)   0.9%  NaCl infusion (has no administration in time range)   morphine injection 4 mg (has no administration in time range)   morphine injection 4 mg (4 mg  Intravenous Given 4/19/22 1801)   sodium chloride 0.9% bolus 1,000 mL (0 mLs Intravenous Stopped 4/19/22 1900)   ampicillin-sulbactam 3 g in sodium chloride 0.9 % 100 mL IVPB (ready to mix system) (0 g Intravenous Stopped 4/19/22 1845)   morphine injection 4 mg (4 mg Intravenous Given 4/19/22 1957)   methylPREDNISolone sodium succinate injection 125 mg (125 mg Intravenous Given 4/19/22 1957)   diphenhydrAMINE injection 50 mg (50 mg Intravenous Given 4/19/22 1958)   iohexoL (OMNIPAQUE 350) injection 75 mL (75 mLs Intravenous Given 4/19/22 2007)     Medical Decision Making:   Initial Assessment:   Patient presents with significant right submandibular swelling after tooth extraction of number 31 and 32. Patient states difficulty swallowing.    Differential Diagnosis:   Abscess, Jose's angina, sialoadenitis, cervical adenitis  ED Management:    Basic labs, CT soft tissue neck, IV antibiotics (Unasyn), pain control, IVF          Scribe Attestation:   Scribe #1: I performed the above scribed service and the documentation accurately describes the services I performed. I attest to the accuracy of the note.    Attending Attestation:             Attending ED Notes:     Patient has Jose's angina per CT scan.  Patient airway intact, states that he feels similar to when he arrived in the ED.  No significant worsening.  No stridor, no hoarseness, tolerating secretions at this time.  Patient will be admitted under [facility].               Clinical Impression:   Final diagnoses:  [L08.9] Soft tissue infection  [K12.2] Jose's angina (Primary)  [K08.409] Status post tooth extraction          ED Disposition Condition    Observation               I, Dr. Patrick Feng, personally performed the services described in this documentation. All medical record entries made by the scribe were at my direction and in my presence. I have reviewed the chart and agree that the record is accurate and complete.   Patrick Feng MD.       Patrick ESCOBEDO  MD Ping  04/19/22 3985

## 2022-04-20 VITALS
TEMPERATURE: 97 F | HEIGHT: 69 IN | OXYGEN SATURATION: 96 % | DIASTOLIC BLOOD PRESSURE: 66 MMHG | RESPIRATION RATE: 18 BRPM | SYSTOLIC BLOOD PRESSURE: 107 MMHG | BODY MASS INDEX: 24 KG/M2 | WEIGHT: 162.06 LBS | HEART RATE: 71 BPM

## 2022-04-20 LAB
ALBUMIN SERPL BCP-MCNC: 3.7 G/DL (ref 3.5–5.2)
ALP SERPL-CCNC: 65 U/L (ref 55–135)
ALT SERPL W/O P-5'-P-CCNC: 29 U/L (ref 10–44)
ANION GAP SERPL CALC-SCNC: 6 MMOL/L (ref 8–16)
AST SERPL-CCNC: 18 U/L (ref 10–40)
BASOPHILS # BLD AUTO: 0.02 K/UL (ref 0–0.2)
BASOPHILS NFR BLD: 0.3 % (ref 0–1.9)
BILIRUB SERPL-MCNC: 0.7 MG/DL (ref 0.1–1)
BUN SERPL-MCNC: 8 MG/DL (ref 6–20)
CALCIUM SERPL-MCNC: 10.1 MG/DL (ref 8.7–10.5)
CHLORIDE SERPL-SCNC: 111 MMOL/L (ref 95–110)
CO2 SERPL-SCNC: 23 MMOL/L (ref 23–29)
CREAT SERPL-MCNC: 0.8 MG/DL (ref 0.5–1.4)
DIFFERENTIAL METHOD: ABNORMAL
EOSINOPHIL # BLD AUTO: 0 K/UL (ref 0–0.5)
EOSINOPHIL NFR BLD: 0.3 % (ref 0–8)
ERYTHROCYTE [DISTWIDTH] IN BLOOD BY AUTOMATED COUNT: 13.4 % (ref 11.5–14.5)
EST. GFR  (AFRICAN AMERICAN): >60 ML/MIN/1.73 M^2
EST. GFR  (NON AFRICAN AMERICAN): >60 ML/MIN/1.73 M^2
GLUCOSE SERPL-MCNC: 186 MG/DL (ref 70–110)
HCT VFR BLD AUTO: 43.4 % (ref 40–54)
HGB BLD-MCNC: 14.4 G/DL (ref 14–18)
IMM GRANULOCYTES # BLD AUTO: 0.03 K/UL (ref 0–0.04)
IMM GRANULOCYTES NFR BLD AUTO: 0.4 % (ref 0–0.5)
LYMPHOCYTES # BLD AUTO: 0.7 K/UL (ref 1–4.8)
LYMPHOCYTES NFR BLD: 9.1 % (ref 18–48)
MAGNESIUM SERPL-MCNC: 2 MG/DL (ref 1.6–2.6)
MCH RBC QN AUTO: 29.9 PG (ref 27–31)
MCHC RBC AUTO-ENTMCNC: 33.2 G/DL (ref 32–36)
MCV RBC AUTO: 90 FL (ref 82–98)
MONOCYTES # BLD AUTO: 0.1 K/UL (ref 0.3–1)
MONOCYTES NFR BLD: 1.4 % (ref 4–15)
NEUTROPHILS # BLD AUTO: 6.7 K/UL (ref 1.8–7.7)
NEUTROPHILS NFR BLD: 88.5 % (ref 38–73)
NRBC BLD-RTO: 0 /100 WBC
PHOSPHATE SERPL-MCNC: <1 MG/DL (ref 2.7–4.5)
PLATELET # BLD AUTO: 190 K/UL (ref 150–450)
PMV BLD AUTO: 10.7 FL (ref 9.2–12.9)
POTASSIUM SERPL-SCNC: 4.1 MMOL/L (ref 3.5–5.1)
PROT SERPL-MCNC: 6.6 G/DL (ref 6–8.4)
RBC # BLD AUTO: 4.81 M/UL (ref 4.6–6.2)
SODIUM SERPL-SCNC: 140 MMOL/L (ref 136–145)
WBC # BLD AUTO: 7.61 K/UL (ref 3.9–12.7)

## 2022-04-20 PROCEDURE — 99204 OFFICE O/P NEW MOD 45 MIN: CPT | Mod: ,,, | Performed by: OTOLARYNGOLOGY

## 2022-04-20 PROCEDURE — 96366 THER/PROPH/DIAG IV INF ADDON: CPT

## 2022-04-20 PROCEDURE — 99204 PR OFFICE/OUTPT VISIT, NEW, LEVL IV, 45-59 MIN: ICD-10-PCS | Mod: ,,, | Performed by: OTOLARYNGOLOGY

## 2022-04-20 PROCEDURE — 83735 ASSAY OF MAGNESIUM: CPT | Performed by: NURSE PRACTITIONER

## 2022-04-20 PROCEDURE — 80053 COMPREHEN METABOLIC PANEL: CPT | Performed by: NURSE PRACTITIONER

## 2022-04-20 PROCEDURE — 96361 HYDRATE IV INFUSION ADD-ON: CPT

## 2022-04-20 PROCEDURE — G0378 HOSPITAL OBSERVATION PER HR: HCPCS

## 2022-04-20 PROCEDURE — 85025 COMPLETE CBC W/AUTO DIFF WBC: CPT | Performed by: NURSE PRACTITIONER

## 2022-04-20 PROCEDURE — 36415 COLL VENOUS BLD VENIPUNCTURE: CPT | Performed by: NURSE PRACTITIONER

## 2022-04-20 PROCEDURE — 84100 ASSAY OF PHOSPHORUS: CPT | Performed by: NURSE PRACTITIONER

## 2022-04-20 PROCEDURE — 25000003 PHARM REV CODE 250: Performed by: NURSE PRACTITIONER

## 2022-04-20 PROCEDURE — 96376 TX/PRO/DX INJ SAME DRUG ADON: CPT

## 2022-04-20 PROCEDURE — 63600175 PHARM REV CODE 636 W HCPCS: Performed by: NURSE PRACTITIONER

## 2022-04-20 RX ADMIN — AMPICILLIN SODIUM AND SULBACTAM SODIUM 3 G: 2; 1 INJECTION, POWDER, FOR SOLUTION INTRAMUSCULAR; INTRAVENOUS at 01:04

## 2022-04-20 RX ADMIN — AMPICILLIN SODIUM AND SULBACTAM SODIUM 3 G: 2; 1 INJECTION, POWDER, FOR SOLUTION INTRAMUSCULAR; INTRAVENOUS at 05:04

## 2022-04-20 RX ADMIN — MORPHINE SULFATE 4 MG: 4 INJECTION INTRAVENOUS at 02:04

## 2022-04-20 NOTE — ED NOTES
Recd report assumed care at this time. Pt demanding something to eat, is angry, and verbally aggressive with staff. Sister is at bedside. Pt states he wants grits,explained to patient that grits are not a good idea because the granules can become inbedded in the tooth bed. Pt still demanding grits.  Pores aware of pt's requests and as said he may have ice chips to begin.

## 2022-04-20 NOTE — SUBJECTIVE & OBJECTIVE
Past Medical History:   Diagnosis Date    Hernia, umbilical        Past Surgical History:   Procedure Laterality Date    HERNIA REPAIR         Review of patient's allergies indicates:   Allergen Reactions    Iodine and iodide containing products Swelling     seafood       No current facility-administered medications on file prior to encounter.     No current outpatient medications on file prior to encounter.     Family History    None       Tobacco Use    Smoking status: Never Smoker    Smokeless tobacco: Not on file   Substance and Sexual Activity    Alcohol use: No     Comment: Denies    Drug use: Yes     Comment: Synthetic marijuana, suboxone    Sexual activity: Not on file     Review of Systems   Constitutional:  Negative for chills and fever.   HENT:  Positive for dental problem. Negative for congestion, rhinorrhea and sinus pressure.    Respiratory:  Negative for chest tightness and shortness of breath.    Cardiovascular:  Negative for chest pain, palpitations and leg swelling.   Gastrointestinal:  Negative for abdominal pain, constipation, diarrhea, nausea and vomiting.   Genitourinary:  Negative for difficulty urinating.   Musculoskeletal:  Negative for arthralgias, gait problem and myalgias.   Skin:  Negative for color change.   Neurological:  Negative for dizziness and weakness.   Hematological:  Does not bruise/bleed easily.   Psychiatric/Behavioral:  Negative for agitation.    Objective:     Vital Signs (Most Recent):  Temp: 97.7 °F (36.5 °C) (04/20/22 0015)  Pulse: (!) 55 (04/20/22 0015)  Resp: 16 (04/20/22 0242)  BP: 114/62 (04/20/22 0015)  SpO2: 96 % (04/20/22 0015)   Vital Signs (24h Range):  Temp:  [97.6 °F (36.4 °C)-97.7 °F (36.5 °C)] 97.7 °F (36.5 °C)  Pulse:  [55-98] 55  Resp:  [14-18] 16  SpO2:  [96 %-98 %] 96 %  BP: (104-143)/(62-86) 114/62     Weight: 73.5 kg (162 lb 0.6 oz)  Body mass index is 23.93 kg/m².    Physical Exam  Constitutional:       Appearance: Normal appearance.   HENT:       Head: Normocephalic.      Comments: Significant submandibular swelling on the right side without induration, tender.  Tongue elevation   Can tolerate secretions and fluids  Teeth 31 and 32 are removed with blood noted in socket, tenderness to palpation  Normal range of motion to the mandible     Mouth/Throat:      Mouth: Mucous membranes are moist.   Cardiovascular:      Rate and Rhythm: Normal rate.   Pulmonary:      Effort: No respiratory distress.      Breath sounds: Normal breath sounds.   Abdominal:      General: Bowel sounds are normal.      Palpations: Abdomen is soft.   Musculoskeletal:      Cervical back: Normal range of motion and neck supple.      Right lower leg: No edema.      Left lower leg: No edema.   Skin:     General: Skin is warm and dry.   Neurological:      Mental Status: He is alert. Mental status is at baseline.           Significant Labs: All pertinent labs within the past 24 hours have been reviewed.    Significant Imaging: I have reviewed all pertinent imaging results/findings within the past 24 hours.

## 2022-04-20 NOTE — ED NOTES
Patient resting comfortably in bed. Patient sleeping. Wakes up hungry. Wanting to eat. Talking in full sentences, no stridor, no trach deviation. Tolerating own secretions. Pain improved. No itching or rash noted post contrast. No increased WOB. Patient still has ice applied to side of face and neck.

## 2022-04-20 NOTE — PROGRESS NOTES
[facility] Medicine  Progress Note    Patient Name: Gary Selby  MRN: [id number]  Patient Class: OP- Observation   Admission Date: 4/19/2022  Length of Stay: 0 days  Attending Physician: No att. providers found  Primary Care Provider: Primary Doctor No        Subjective:     Principal Problem:Soft tissue infection        HPI:  This is a 31 y.o. male who has a past medical history of  umbilical hernia who presents to the Emergency Department with dental pain. He states he had two right lower teeth removed this morning. He states difficulty with swallowing secondary to pain. Swelling is noted to right side of his jaw. CT of Neck showed extensive inflammation and soft tissue edema of the right submandibular space extending to the right parapharyngeal space with resultant mass effect and leftward deviation of the oropharynx.  No evidence of a large rim enhancing fluid collection.  Correlate clinically for evidence of Jose angina. Patient admitted to hospital medicine observation status for further medical management.          Overview/Hospital Course:  No notes on file    Interval History: awake and alert, examined by Dr. Pavon, patient tlerating liquid and noted mouth and neck pain has improved.     Review of Systems   Constitutional:  Negative for chills and fever.   HENT:  Positive for dental problem. Negative for congestion, rhinorrhea and sinus pressure.    Respiratory:  Negative for chest tightness and shortness of breath.    Cardiovascular:  Negative for chest pain, palpitations and leg swelling.   Gastrointestinal:  Negative for abdominal pain, constipation, diarrhea, nausea and vomiting.   Genitourinary:  Negative for difficulty urinating.   Musculoskeletal:  Negative for arthralgias, gait problem and myalgias.   Skin:  Negative for color change.   Neurological:  Negative for dizziness and weakness.   Hematological:  Does not bruise/bleed easily.   Psychiatric/Behavioral:  Negative for  agitation.    Objective:     Vital Signs (Most Recent):  Temp: 96.9 °F (36.1 °C) (04/20/22 0713)  Pulse: 71 (04/20/22 0724)  Resp: 18 (04/20/22 0713)  BP: 107/66 (04/20/22 0713)  SpO2: 96 % (04/20/22 0713) Vital Signs (24h Range):  Temp:  [96.6 °F (35.9 °C)-97.7 °F (36.5 °C)] 96.9 °F (36.1 °C)  Pulse:  [55-98] 71  Resp:  [14-18] 18  SpO2:  [96 %-98 %] 96 %  BP: (104-143)/(56-86) 107/66     Weight: 73.5 kg (162 lb 0.6 oz)  Body mass index is 23.93 kg/m².  No intake or output data in the 24 hours ending 04/20/22 0819   Physical Exam  Constitutional:       Appearance: Normal appearance.   HENT:      Head: Normocephalic.      Jaw: No swelling.      Comments: Teeth 31 and 32 are removed with blood noted in socket, mild tenderness to palpation  Normal range of motion to the mandible     Mouth/Throat:      Tongue: No lesions. Tongue does not deviate from midline.      Comments: No tongue swelling. No neck tenderness  Cardiovascular:      Rate and Rhythm: Normal rate.   Pulmonary:      Effort: No respiratory distress.      Breath sounds: Normal breath sounds.   Abdominal:      General: Bowel sounds are normal.      Palpations: Abdomen is soft.   Musculoskeletal:      Cervical back: Normal range of motion and neck supple.      Right lower leg: No edema.      Left lower leg: No edema.   Skin:     General: Skin is warm and dry.   Neurological:      Mental Status: He is alert. Mental status is at baseline.       Significant Labs: A1C: No results for input(s): HGBA1C in the last 4320 hours.  Blood Culture: No results for input(s): LABBLOO in the last 48 hours.  CBC:   Recent Labs   Lab 04/19/22 1758 04/20/22 0132   WBC 9.15 7.61   HGB 14.4 14.4   HCT 43.0 43.4    190     CMP:   Recent Labs   Lab 04/19/22 1758 04/20/22 0132    140   K 4.2 4.1    111*   CO2 25 23   GLU 84 186*   BUN 9 8   CREATININE 0.8 0.8   CALCIUM 10.8* 10.1   PROT 6.8 6.6   ALBUMIN 4.0 3.7   BILITOT 0.4 0.7   ALKPHOS 70 65   AST 24 18    ALT 32 29   ANIONGAP 7* 6*   EGFRNONAA >60 >60     Coagulation: No results for input(s): PT, INR, APTT in the last 48 hours.  Lactic Acid: No results for input(s): LACTATE in the last 48 hours.  Lipase: No results for input(s): LIPASE in the last 48 hours.  Lipid Panel: No results for input(s): CHOL, HDL, LDLCALC, TRIG, CHOLHDL in the last 48 hours.  Magnesium:   Recent Labs   Lab 04/20/22  0132   MG 2.0     POCT Glucose: No results for input(s): POCTGLUCOSE in the last 48 hours.  TSH: No results for input(s): TSH in the last 4320 hours.  Urine Culture: No results for input(s): LABURIN in the last 48 hours.  Urine Studies: No results for input(s): COLORU, APPEARANCEUA, PHUR, SPECGRAV, PROTEINUA, GLUCUA, KETONESU, BILIRUBINUA, OCCULTUA, NITRITE, UROBILINOGEN, LEUKOCYTESUR, RBCUA, WBCUA, BACTERIA, SQUAMEPITHEL, HYALINECASTS in the last 48 hours.    Invalid input(s): WILLIE    Significant Imaging: I have reviewed all pertinent imaging results/findings within the past 24 hours.      Assessment/Plan:      * Soft tissue infection  Aspiration precautions  Clear liquids  Continue ABX  Consult ENT - appreciate rec's- continue with IV  abx, soft diet and advance as tolerated  Prn pain medication        VTE Risk Mitigation (From admission, onward)         Ordered     Place sequential compression device  Until discontinued         04/19/22 9503                Discharge Planning   JAYDEN:      Code Status: Full Code   Is the patient medically ready for discharge?:     Reason for patient still in hospital (select all that apply): Patient trending condition                     Jeannie N MD [place]  Department of Hospital Medicine   [facility]

## 2022-04-20 NOTE — ASSESSMENT & PLAN NOTE
Aspiration precautions  Clear liquids  Continue ABX  Consult ENT - appreciate rec's- continue with IV  abx, soft diet and advance as tolerated  Prn pain medication

## 2022-04-20 NOTE — SUBJECTIVE & OBJECTIVE
Interval History: awake and alert, examined by Dr. Pavon, patient tlerating liquid and noted mouth and neck pain has improved.     Review of Systems   Constitutional:  Negative for chills and fever.   HENT:  Positive for dental problem. Negative for congestion, rhinorrhea and sinus pressure.    Respiratory:  Negative for chest tightness and shortness of breath.    Cardiovascular:  Negative for chest pain, palpitations and leg swelling.   Gastrointestinal:  Negative for abdominal pain, constipation, diarrhea, nausea and vomiting.   Genitourinary:  Negative for difficulty urinating.   Musculoskeletal:  Negative for arthralgias, gait problem and myalgias.   Skin:  Negative for color change.   Neurological:  Negative for dizziness and weakness.   Hematological:  Does not bruise/bleed easily.   Psychiatric/Behavioral:  Negative for agitation.    Objective:     Vital Signs (Most Recent):  Temp: 96.9 °F (36.1 °C) (04/20/22 0713)  Pulse: 71 (04/20/22 0724)  Resp: 18 (04/20/22 0713)  BP: 107/66 (04/20/22 0713)  SpO2: 96 % (04/20/22 0713) Vital Signs (24h Range):  Temp:  [96.6 °F (35.9 °C)-97.7 °F (36.5 °C)] 96.9 °F (36.1 °C)  Pulse:  [55-98] 71  Resp:  [14-18] 18  SpO2:  [96 %-98 %] 96 %  BP: (104-143)/(56-86) 107/66     Weight: 73.5 kg (162 lb 0.6 oz)  Body mass index is 23.93 kg/m².  No intake or output data in the 24 hours ending 04/20/22 0819   Physical Exam  Constitutional:       Appearance: Normal appearance.   HENT:      Head: Normocephalic.      Jaw: No swelling.      Comments: Teeth 31 and 32 are removed with blood noted in socket, mild tenderness to palpation  Normal range of motion to the mandible     Mouth/Throat:      Tongue: No lesions. Tongue does not deviate from midline.      Comments: No tongue swelling. No neck tenderness  Cardiovascular:      Rate and Rhythm: Normal rate.   Pulmonary:      Effort: No respiratory distress.      Breath sounds: Normal breath sounds.   Abdominal:      General: Bowel sounds  are normal.      Palpations: Abdomen is soft.   Musculoskeletal:      Cervical back: Normal range of motion and neck supple.      Right lower leg: No edema.      Left lower leg: No edema.   Skin:     General: Skin is warm and dry.   Neurological:      Mental Status: He is alert. Mental status is at baseline.       Significant Labs: A1C: No results for input(s): HGBA1C in the last 4320 hours.  Blood Culture: No results for input(s): LABBLOO in the last 48 hours.  CBC:   Recent Labs   Lab 04/19/22 1758 04/20/22 0132   WBC 9.15 7.61   HGB 14.4 14.4   HCT 43.0 43.4    190     CMP:   Recent Labs   Lab 04/19/22 1758 04/20/22 0132    140   K 4.2 4.1    111*   CO2 25 23   GLU 84 186*   BUN 9 8   CREATININE 0.8 0.8   CALCIUM 10.8* 10.1   PROT 6.8 6.6   ALBUMIN 4.0 3.7   BILITOT 0.4 0.7   ALKPHOS 70 65   AST 24 18   ALT 32 29   ANIONGAP 7* 6*   EGFRNONAA >60 >60     Coagulation: No results for input(s): PT, INR, APTT in the last 48 hours.  Lactic Acid: No results for input(s): LACTATE in the last 48 hours.  Lipase: No results for input(s): LIPASE in the last 48 hours.  Lipid Panel: No results for input(s): CHOL, HDL, LDLCALC, TRIG, CHOLHDL in the last 48 hours.  Magnesium:   Recent Labs   Lab 04/20/22 0132   MG 2.0     POCT Glucose: No results for input(s): POCTGLUCOSE in the last 48 hours.  TSH: No results for input(s): TSH in the last 4320 hours.  Urine Culture: No results for input(s): LABURIN in the last 48 hours.  Urine Studies: No results for input(s): COLORU, APPEARANCEUA, PHUR, SPECGRAV, PROTEINUA, GLUCUA, KETONESU, BILIRUBINUA, OCCULTUA, NITRITE, UROBILINOGEN, LEUKOCYTESUR, RBCUA, WBCUA, BACTERIA, SQUAMEPITHEL, HYALINECASTS in the last 48 hours.    Invalid input(s): BRUCER    Significant Imaging: I have reviewed all pertinent imaging results/findings within the past 24 hours.

## 2022-04-20 NOTE — ASSESSMENT & PLAN NOTE
Aspiration precautions  Clear liquids  Continue ABX  Consult ENT - appreciate rec's- continue with IV  abx, soft diet and advance as tolerated  Prn pain medication  Left AMA

## 2022-04-20 NOTE — H&P
[facility] Medicine  History & Physical    Patient Name: Gary Selby  MRN: [id number]  Patient Class: OP- Observation  Admission Date: 4/19/2022  Attending Physician: Jeannie Hart*   Primary Care Provider: Primary Doctor No         Patient information was obtained from patient and ER records.     Subjective:     Principal Problem:Soft tissue infection    Chief Complaint:   Chief Complaint   Patient presents with    Dental Pain     Pt had right lower tooth pulled today, pt complains of continued pain         HPI: This is a 31 y.o. male who has a past medical history of  umbilical hernia who presents to the Emergency Department with dental pain. He states he had two right lower teeth removed this morning. He states difficulty with swallowing secondary to pain. Swelling is noted to right side of his jaw. CT of Neck showed extensive inflammation and soft tissue edema of the right submandibular space extending to the right parapharyngeal space with resultant mass effect and leftward deviation of the oropharynx.  No evidence of a large rim enhancing fluid collection.  Correlate clinically for evidence of Jose angina. Patient admitted to hospital medicine observation status for further medical management.          Past Medical History:   Diagnosis Date    Hernia, umbilical        Past Surgical History:   Procedure Laterality Date    HERNIA REPAIR         Review of patient's allergies indicates:   Allergen Reactions    Iodine and iodide containing products Swelling     seafood       No current facility-administered medications on file prior to encounter.     No current outpatient medications on file prior to encounter.     Family History    None       Tobacco Use    Smoking status: Never Smoker    Smokeless tobacco: Not on file   Substance and Sexual Activity    Alcohol use: No     Comment: Denies    Drug use: Yes     Comment: Synthetic marijuana, suboxone    Sexual activity: Not  on file     Review of Systems   Constitutional:  Negative for chills and fever.   HENT:  Positive for dental problem. Negative for congestion, rhinorrhea and sinus pressure.    Respiratory:  Negative for chest tightness and shortness of breath.    Cardiovascular:  Negative for chest pain, palpitations and leg swelling.   Gastrointestinal:  Negative for abdominal pain, constipation, diarrhea, nausea and vomiting.   Genitourinary:  Negative for difficulty urinating.   Musculoskeletal:  Negative for arthralgias, gait problem and myalgias.   Skin:  Negative for color change.   Neurological:  Negative for dizziness and weakness.   Hematological:  Does not bruise/bleed easily.   Psychiatric/Behavioral:  Negative for agitation.    Objective:     Vital Signs (Most Recent):  Temp: 97.7 °F (36.5 °C) (04/20/22 0015)  Pulse: (!) 55 (04/20/22 0015)  Resp: 16 (04/20/22 0242)  BP: 114/62 (04/20/22 0015)  SpO2: 96 % (04/20/22 0015)   Vital Signs (24h Range):  Temp:  [97.6 °F (36.4 °C)-97.7 °F (36.5 °C)] 97.7 °F (36.5 °C)  Pulse:  [55-98] 55  Resp:  [14-18] 16  SpO2:  [96 %-98 %] 96 %  BP: (104-143)/(62-86) 114/62     Weight: 73.5 kg (162 lb 0.6 oz)  Body mass index is 23.93 kg/m².    Physical Exam  Constitutional:       Appearance: Normal appearance.   HENT:      Head: Normocephalic.      Comments: Significant submandibular swelling on the right side without induration, tender.  Tongue elevation   Can tolerate secretions and fluids  Teeth 31 and 32 are removed with blood noted in socket, tenderness to palpation  Normal range of motion to the mandible     Mouth/Throat:      Mouth: Mucous membranes are moist.   Cardiovascular:      Rate and Rhythm: Normal rate.   Pulmonary:      Effort: No respiratory distress.      Breath sounds: Normal breath sounds.   Abdominal:      General: Bowel sounds are normal.      Palpations: Abdomen is soft.   Musculoskeletal:      Cervical back: Normal range of motion and neck supple.      Right lower  leg: No edema.      Left lower leg: No edema.   Skin:     General: Skin is warm and dry.   Neurological:      Mental Status: He is alert. Mental status is at baseline.           Significant Labs: All pertinent labs within the past 24 hours have been reviewed.    Significant Imaging: I have reviewed all pertinent imaging results/findings within the past 24 hours.    Assessment/Plan:     * Soft tissue infection  Aspiration precautions  Clear liquids  Continue ABX  Consult ENT   Prn pain medication        VTE Risk Mitigation (From admission, onward)         Ordered     Place sequential compression device  Until discontinued         04/19/22 4310                   Rubina Razo NP  Department of Hospital Medicine   [place] [facility]

## 2022-04-20 NOTE — CONSULTS
[facility]  Otorhinolaryngology-Head & Neck Surgery  Consult Note    Patient Name: Gary Selby  MRN: [id number]  Code Status: Full Code  Admission Date: 4/19/2022  Hospital Length of Stay: 0 days  Attending Physician: Jeannie Hart*  Primary Care Provider: Primary Doctor No        Consults  Subjective:     Reason for consult: dental pain    History of Present Illness: This is a 31 y.o. male who has a past medical history of  umbilical hernia who presents to the Emergency Department with dental pain. He presented to the ED last night after extraction of 2 right mandibular molars earlier that day. He was experiencing difficulty with swallowing secondary to pain and swelling.  Swelling was present to right side of his jaw/neck as well. CT of Neck showed extensive inflammation and soft tissue edema of the right submandibular space extending to the right parapharyngeal space with resultant mass effect and leftward deviation of the oropharynx.  No evidence of rim enhancing fluide collection. Patient was admitted to hospital medicine observation status for further medical management. This morning the patient reports that the pain, swelling that initially brought him to ED has improved. He notes that he is able to open his mouth completeley and is able to swallow well. No voice changes.       No new subjective & objective note has been filed under this hospital service since the last note was generated.    Assessment/Plan:     * Soft tissue infection  Dental extractions complicated by Soft tissue edema/infection. No abscess present on CT scan at this time. Recommend continuing IV antibiotics ampicillin-sulbactam and Decadron 8mg IV now and then 8mg q 8 hours for 3 doses. Patient is already clinically improving since initiation of antibiotics and steroids last night in ED.  Expect continued clinical improvement and possible discharge tomorrow. If patient clinically worsens, will need to consider  transfer to center with OMFS due to complication of dental extraction.     Care plan discussed with hospital medicine team.     VTE Risk Mitigation (From admission, onward)         Ordered     Place sequential compression device  Until discontinued         04/19/22 8834                Thank you for your consult.     Cristal Bradley MD  Otorhinolaryngology-Head & Neck Surgery  [place] [facility]

## 2022-04-20 NOTE — NURSING
Pt arrived to room via stretcher from ER, ambulated to bed, AAOX4 c/o hunger, skin intact, tele monitor in place, no distress noted, MD notified of arrival,

## 2022-04-20 NOTE — HPI
This is a 31 y.o. male who has a past medical history of  umbilical hernia who presents to the Emergency Department with dental pain. He presented to the ED last night after extraction of 2 right mandibular molars earlier that day. He was experiencing difficulty with swallowing secondary to pain and swelling.  Swelling was present to right side of his jaw/neck as well. CT of Neck showed extensive inflammation and soft tissue edema of the right submandibular space extending to the right parapharyngeal space with resultant mass effect and leftward deviation of the oropharynx.  No evidence of rim enhancing fluide collection. Patient was admitted to hospital medicine observation status for further medical management. This morning the patient reports that the pain, swelling that initially brought him to ED has improved. He notes that he is able to open his mouth completeley and is able to swallow well. No voice changes.

## 2022-04-20 NOTE — ASSESSMENT & PLAN NOTE
Dental extractions complicated by Soft tissue edema/infection. No abscess present on CT scan at this time. Recommend continuing IV antibiotics ampicillin-sulbactam and Decadron 8mg IV now and then 8mg q 8 hours for 3 doses. Patient is already clinically improving since initiation of antibiotics and steroids last night in ED.  Expect continued clinical improvement and possible discharge tomorrow. If patient clinically worsens, will need to consider transfer to center with OMFS due to complication of dental extraction.

## 2022-04-20 NOTE — DISCHARGE SUMMARY
[facility] Medicine  Discharge Summary      Patient Name: Gary Selby  MRN: [id number]  Patient Class: OP- Observation  Admission Date: 4/19/2022  Hospital Length of Stay: 0 days  Discharge Date and Time: 4/20/2022  9:50 AM  Attending Physician: No att. providers found   Discharging Provider: Jeannie Hart MD  Primary Care Provider: Primary Doctor No      HPI:   This is a 31 y.o. male who has a past medical history of  umbilical hernia who presents to the Emergency Department with dental pain. He states he had two right lower teeth removed this morning. He states difficulty with swallowing secondary to pain. Swelling is noted to right side of his jaw. CT of Neck showed extensive inflammation and soft tissue edema of the right submandibular space extending to the right parapharyngeal space with resultant mass effect and leftward deviation of the oropharynx.  No evidence of a large rim enhancing fluid collection.  Correlate clinically for evidence of Jose angina. Patient admitted to hospital medicine observation status for further medical management.          * No surgery found *      Hospital Course:   Admitted with soft tissue infection s/p dental extractions, CT scan reported no abscess and started on ampicillin-sulbactam and Decadron. ENT consulted and recommended IV abx and steroid  Reported patient wants to leave AMA. Patient requesting to have regular diet as against soft diet recommended by ENT. Updated patient I was in the room during ENT evaluation and recommendation, we need to continue with IV antibiotics and monitor for possible rebound tongue and neck swelling. Patient pulled the sheet over his head and ended the conversation. Patient later signed AMA paperwork and left        Goals of Care Treatment Preferences:  Code Status: Full Code      Consults:     * Soft tissue infection  Aspiration precautions  Clear liquids  Continue ABX  Consult ENT - appreciate rec's-  continue with IV  abx, soft diet and advance as tolerated  Prn pain medication  Left AMA        Final Active Diagnoses:    Diagnosis Date Noted POA    PRINCIPAL PROBLEM:  Soft tissue infection [L08.9] 04/19/2022 Yes      Problems Resolved During this Admission:       Discharged Condition: against medical advice    Disposition: Left Against Medical Adv*      Pending Diagnostic Studies:     None         Medications:  None    Indwelling Lines/Drains at time of discharge:   Lines/Drains/Airways     None                 Time spent on the discharge of patient: 35 minutes         Jeannie Hart MD  Department of [facility] Medicine  [facility]

## 2022-04-20 NOTE — HPI
This is a 31 y.o. male who has a past medical history of  umbilical hernia who presents to the Emergency Department with dental pain. He states he had two right lower teeth removed this morning. He states difficulty with swallowing secondary to pain. Swelling is noted to right side of his jaw. CT of Neck showed extensive inflammation and soft tissue edema of the right submandibular space extending to the right parapharyngeal space with resultant mass effect and leftward deviation of the oropharynx.  No evidence of a large rim enhancing fluid collection.  Correlate clinically for evidence of Jose angina. Patient admitted to hospital medicine observation status for further medical management.

## 2022-04-20 NOTE — ED NOTES
RN took over patient's care. Patient awake, alert, in bed. Complaints of pain to R side of neck. Voice changes to normal speech. Able to talk in complete sentences, tolerating saliva. Ice applied to neck. No increased WOB.
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
## 2022-04-20 NOTE — PLAN OF CARE
Pt left AMA.        04/20/22 1114   Final Note   Assessment Type Final Discharge Note   Anticipated Discharge Disposition Left Against   What phone number can be called within the next 1-3 days to see how you are doing after discharge? [phone]   Post-Acute Status   Discharge Delays None known at this time

## 2022-04-20 NOTE — CARE UPDATE
Reported patient wants to leave AMA. Patient requesting to have regular diet as against soft diet recommended by ENT. Updated patient I was in the room during ENT evaluation and recommendation, we need to continue with IV antibiotics and monitor for possible rebound tongue and neck swelling. Patient pulled the sheet over his head and ended the conversation.

## 2022-04-20 NOTE — SUBJECTIVE & OBJECTIVE
Medications:  Continuous Infusions:   sodium chloride 0.9% 100 mL/hr at 04/19/22 2311     Scheduled Meds:   ampicillin-sulbactim (UNASYN) IVPB  3 g Intravenous Q6H     PRN Meds:morphine     No current facility-administered medications on file prior to encounter.     No current outpatient medications on file prior to encounter.       Review of patient's allergies indicates:   Allergen Reactions    Iodine and iodide containing products Swelling     seafood       Past Medical History:   Diagnosis Date    Hernia, umbilical      Past Surgical History:   Procedure Laterality Date    HERNIA REPAIR       Family History    None       Tobacco Use    Smoking status: Never Smoker    Smokeless tobacco: Not on file   Substance and Sexual Activity    Alcohol use: No     Comment: Denies    Drug use: Yes     Comment: Synthetic marijuana, suboxone    Sexual activity: Not on file     Review of Systems   HENT:  Positive for facial swelling, mouth sores and trouble swallowing.    All other systems reviewed and are negative.  Objective:     Vital Signs (Most Recent):  Temp: 96.9 °F (36.1 °C) (04/20/22 0713)  Pulse: 71 (04/20/22 0724)  Resp: 18 (04/20/22 0713)  BP: 107/66 (04/20/22 0713)  SpO2: 96 % (04/20/22 0713) Vital Signs (24h Range):  Temp:  [96.6 °F (35.9 °C)-97.7 °F (36.5 °C)] 96.9 °F (36.1 °C)  Pulse:  [55-98] 71  Resp:  [14-18] 18  SpO2:  [96 %-98 %] 96 %  BP: (104-143)/(56-86) 107/66     Weight: 73.5 kg (162 lb 0.6 oz)  Body mass index is 23.93 kg/m².        Physical Exam  Physical Exam     Vitals:    04/20/22 0724   BP:    Pulse: 71   Resp:    Temp:        Constitutional: Well appearing / communicating.  NAD.  Eyes: EOM I Bilaterally  Head/Face: Normocephalic.  Negative paranasal sinus pressure/tenderness.  Salivary glands : + right submandibular gland edema House Brackmann I Bilaterally.    Nose: No gross nasal septal deviation. Inferior Turbinates 3+ bilaterally. No septal perforation. No masses/lesions. External nasal skin  without masses/lesions.  Oral Cavity: Gingiva/lips WNL.  FOM Soft, no induration or fluctuance. Mild edema present; significant ecchymosis of the Floor of mouth tissue, right mandibular gingiva, buccal mucosa, retromolar trigone, and right lateral pharyngeal wall, tooth socket with dried blood; no purulence; no fluctuance. no masses palpated. Oral Tongue mobile. Hard Palate WNL.   Oropharynx: BOT WNL. No masses/lesions noted. Tonsillar fossa/pharyngeal wall without lesions. Posterior oropharynx WNL.  No oropharyngeal airway narrowing present currently. Soft palate without masses. Midline uvula.   Neck/Lymphatic: No LAD I-VI bilaterally.  No thyromegaly.  No masses noted on exam.          Significant Labs:  CBC:   Recent Labs   Lab 04/20/22 0132   WBC 7.61   RBC 4.81   HGB 14.4   HCT 43.4      MCV 90   MCH 29.9   MCHC 33.2     CMP:   Recent Labs   Lab 04/20/22 0132   *   CALCIUM 10.1   ALBUMIN 3.7   PROT 6.6      K 4.1   CO2 23   *   BUN 8   CREATININE 0.8   ALKPHOS 65   ALT 29   AST 18   BILITOT 0.7       Significant Diagnostics:  CT soft tissue neck with contrast 4/19/2022:Extensive inflammation and soft tissue edema of the right submandibular space extending to the right parapharyngeal space with resultant mass effect and leftward deviation of the oropharynx.  No evidence of a large rim enhancing fluid collection.

## 2022-04-20 NOTE — HOSPITAL COURSE
Admitted with soft tissue infection s/p dental extractions, CT scan reported no abscess and started on ampicillin-sulbactam and Decadron. ENT consulted and recommended IV abx and steroid  Reported patient wants to leave AMA. Patient requesting to have regular diet as against soft diet recommended by ENT. Updated patient I was in the room during ENT evaluation and recommendation, we need to continue with IV antibiotics and monitor for possible rebound tongue and neck swelling. Patient pulled the sheet over his head and ended the conversation. Patient later signed AMA paperwork and left

## 2022-08-22 ENCOUNTER — HOSPITAL ENCOUNTER (EMERGENCY)
Facility: HOSPITAL | Age: 32
Discharge: HOME | End: 2022-08-22
Attending: EMERGENCY MEDICINE
Payer: MEDICAID

## 2022-08-22 VITALS
WEIGHT: 160 LBS | BODY MASS INDEX: 23.7 KG/M2 | DIASTOLIC BLOOD PRESSURE: 61 MMHG | TEMPERATURE: 99 F | OXYGEN SATURATION: 99 % | HEART RATE: 86 BPM | RESPIRATION RATE: 20 BRPM | SYSTOLIC BLOOD PRESSURE: 118 MMHG | HEIGHT: 69 IN

## 2022-08-22 DIAGNOSIS — M54.2 NECK PAIN: ICD-10-CM

## 2022-08-22 PROCEDURE — 99283 EMERGENCY DEPT VISIT LOW MDM: CPT
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
## 2022-08-24 ENCOUNTER — PATIENT OUTREACH (OUTPATIENT)
Dept: EMERGENCY MEDICINE | Facility: HOSPITAL | Age: 32
End: 2022-08-24
Payer: MEDICAID
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
